# Patient Record
Sex: MALE | Race: WHITE | URBAN - METROPOLITAN AREA
[De-identification: names, ages, dates, MRNs, and addresses within clinical notes are randomized per-mention and may not be internally consistent; named-entity substitution may affect disease eponyms.]

---

## 2022-06-27 ENCOUNTER — INPATIENT (INPATIENT)
Facility: HOSPITAL | Age: 62
LOS: 0 days | Discharge: HOME | DRG: 380 | End: 2022-06-28
Attending: FAMILY MEDICINE | Admitting: FAMILY MEDICINE
Payer: MEDICAID

## 2022-06-27 VITALS
WEIGHT: 210.1 LBS | HEART RATE: 81 BPM | SYSTOLIC BLOOD PRESSURE: 155 MMHG | RESPIRATION RATE: 16 BRPM | DIASTOLIC BLOOD PRESSURE: 94 MMHG | OXYGEN SATURATION: 100 % | TEMPERATURE: 99 F | HEIGHT: 72 IN

## 2022-06-27 DIAGNOSIS — L03.90 CELLULITIS, UNSPECIFIED: ICD-10-CM

## 2022-06-27 DIAGNOSIS — Z98.890 OTHER SPECIFIED POSTPROCEDURAL STATES: Chronic | ICD-10-CM

## 2022-06-27 LAB
ADD ON TEST-SPECIMEN IN LAB: SIGNIFICANT CHANGE UP
ADD ON TEST-SPECIMEN IN LAB: SIGNIFICANT CHANGE UP
ALBUMIN SERPL ELPH-MCNC: 3.7 G/DL — SIGNIFICANT CHANGE UP (ref 3.3–5)
ALP SERPL-CCNC: 79 U/L — SIGNIFICANT CHANGE UP (ref 40–120)
ALT FLD-CCNC: 18 U/L — SIGNIFICANT CHANGE UP (ref 12–78)
ANION GAP SERPL CALC-SCNC: 5 MMOL/L — SIGNIFICANT CHANGE UP (ref 5–17)
APTT BLD: 31.7 SEC — SIGNIFICANT CHANGE UP (ref 27.5–35.5)
AST SERPL-CCNC: 14 U/L — LOW (ref 15–37)
BASOPHILS # BLD AUTO: 0.03 K/UL — SIGNIFICANT CHANGE UP (ref 0–0.2)
BASOPHILS NFR BLD AUTO: 0.6 % — SIGNIFICANT CHANGE UP (ref 0–2)
BILIRUB SERPL-MCNC: 0.4 MG/DL — SIGNIFICANT CHANGE UP (ref 0.2–1.2)
BUN SERPL-MCNC: 19 MG/DL — SIGNIFICANT CHANGE UP (ref 7–23)
CALCIUM SERPL-MCNC: 9.2 MG/DL — SIGNIFICANT CHANGE UP (ref 8.5–10.1)
CHLORIDE SERPL-SCNC: 108 MMOL/L — SIGNIFICANT CHANGE UP (ref 96–108)
CO2 SERPL-SCNC: 28 MMOL/L — SIGNIFICANT CHANGE UP (ref 22–31)
CREAT SERPL-MCNC: 0.87 MG/DL — SIGNIFICANT CHANGE UP (ref 0.5–1.3)
EGFR: 97 ML/MIN/1.73M2 — SIGNIFICANT CHANGE UP
EOSINOPHIL # BLD AUTO: 0.09 K/UL — SIGNIFICANT CHANGE UP (ref 0–0.5)
EOSINOPHIL NFR BLD AUTO: 1.8 % — SIGNIFICANT CHANGE UP (ref 0–6)
FLUAV AG NPH QL: SIGNIFICANT CHANGE UP
FLUBV AG NPH QL: SIGNIFICANT CHANGE UP
GLUCOSE SERPL-MCNC: 99 MG/DL — SIGNIFICANT CHANGE UP (ref 70–99)
HCT VFR BLD CALC: 41.4 % — SIGNIFICANT CHANGE UP (ref 39–50)
HGB BLD-MCNC: 13.8 G/DL — SIGNIFICANT CHANGE UP (ref 13–17)
IMM GRANULOCYTES NFR BLD AUTO: 0.2 % — SIGNIFICANT CHANGE UP (ref 0–1.5)
INR BLD: 1.09 RATIO — SIGNIFICANT CHANGE UP (ref 0.88–1.16)
LACTATE SERPL-SCNC: 1 MMOL/L — SIGNIFICANT CHANGE UP (ref 0.7–2)
LYMPHOCYTES # BLD AUTO: 1.02 K/UL — SIGNIFICANT CHANGE UP (ref 1–3.3)
LYMPHOCYTES # BLD AUTO: 20.4 % — SIGNIFICANT CHANGE UP (ref 13–44)
MCHC RBC-ENTMCNC: 29.1 PG — SIGNIFICANT CHANGE UP (ref 27–34)
MCHC RBC-ENTMCNC: 33.3 GM/DL — SIGNIFICANT CHANGE UP (ref 32–36)
MCV RBC AUTO: 87.3 FL — SIGNIFICANT CHANGE UP (ref 80–100)
MONOCYTES # BLD AUTO: 0.36 K/UL — SIGNIFICANT CHANGE UP (ref 0–0.9)
MONOCYTES NFR BLD AUTO: 7.2 % — SIGNIFICANT CHANGE UP (ref 2–14)
NEUTROPHILS # BLD AUTO: 3.49 K/UL — SIGNIFICANT CHANGE UP (ref 1.8–7.4)
NEUTROPHILS NFR BLD AUTO: 69.8 % — SIGNIFICANT CHANGE UP (ref 43–77)
PLATELET # BLD AUTO: 161 K/UL — SIGNIFICANT CHANGE UP (ref 150–400)
POTASSIUM SERPL-MCNC: 4.2 MMOL/L — SIGNIFICANT CHANGE UP (ref 3.5–5.3)
POTASSIUM SERPL-SCNC: 4.2 MMOL/L — SIGNIFICANT CHANGE UP (ref 3.5–5.3)
PROT SERPL-MCNC: 7.9 GM/DL — SIGNIFICANT CHANGE UP (ref 6–8.3)
PROTHROM AB SERPL-ACNC: 12.6 SEC — SIGNIFICANT CHANGE UP (ref 10.5–13.4)
RBC # BLD: 4.74 M/UL — SIGNIFICANT CHANGE UP (ref 4.2–5.8)
RBC # FLD: 14.5 % — SIGNIFICANT CHANGE UP (ref 10.3–14.5)
RSV RNA NPH QL NAA+NON-PROBE: SIGNIFICANT CHANGE UP
SARS-COV-2 RNA SPEC QL NAA+PROBE: SIGNIFICANT CHANGE UP
SODIUM SERPL-SCNC: 141 MMOL/L — SIGNIFICANT CHANGE UP (ref 135–145)
WBC # BLD: 5 K/UL — SIGNIFICANT CHANGE UP (ref 3.8–10.5)
WBC # FLD AUTO: 5 K/UL — SIGNIFICANT CHANGE UP (ref 3.8–10.5)

## 2022-06-27 PROCEDURE — 71045 X-RAY EXAM CHEST 1 VIEW: CPT | Mod: 26

## 2022-06-27 PROCEDURE — 93005 ELECTROCARDIOGRAM TRACING: CPT

## 2022-06-27 PROCEDURE — 73630 X-RAY EXAM OF FOOT: CPT | Mod: 26,RT

## 2022-06-27 PROCEDURE — 81001 URINALYSIS AUTO W/SCOPE: CPT

## 2022-06-27 PROCEDURE — 73590 X-RAY EXAM OF LOWER LEG: CPT | Mod: 26,RT

## 2022-06-27 PROCEDURE — 93971 EXTREMITY STUDY: CPT | Mod: RT

## 2022-06-27 PROCEDURE — 93010 ELECTROCARDIOGRAM REPORT: CPT

## 2022-06-27 PROCEDURE — 93926 LOWER EXTREMITY STUDY: CPT | Mod: 26,RT

## 2022-06-27 PROCEDURE — 93971 EXTREMITY STUDY: CPT | Mod: 26,RT

## 2022-06-27 PROCEDURE — 93926 LOWER EXTREMITY STUDY: CPT | Mod: RT

## 2022-06-27 PROCEDURE — 99223 1ST HOSP IP/OBS HIGH 75: CPT

## 2022-06-27 PROCEDURE — 99285 EMERGENCY DEPT VISIT HI MDM: CPT

## 2022-06-27 RX ORDER — PIPERACILLIN AND TAZOBACTAM 4; .5 G/20ML; G/20ML
3.38 INJECTION, POWDER, LYOPHILIZED, FOR SOLUTION INTRAVENOUS ONCE
Refills: 0 | Status: COMPLETED | OUTPATIENT
Start: 2022-06-27 | End: 2022-06-27

## 2022-06-27 RX ORDER — MILK THISTLE 150 MG
3 CAPSULE ORAL
Qty: 0 | Refills: 0 | DISCHARGE

## 2022-06-27 RX ORDER — PIPERACILLIN AND TAZOBACTAM 4; .5 G/20ML; G/20ML
3.38 INJECTION, POWDER, LYOPHILIZED, FOR SOLUTION INTRAVENOUS EVERY 8 HOURS
Refills: 0 | Status: DISCONTINUED | OUTPATIENT
Start: 2022-06-27 | End: 2022-06-28

## 2022-06-27 RX ORDER — ACETAMINOPHEN 500 MG
650 TABLET ORAL EVERY 6 HOURS
Refills: 0 | Status: DISCONTINUED | OUTPATIENT
Start: 2022-06-27 | End: 2022-06-28

## 2022-06-27 RX ORDER — KETOCONAZOLE 20 MG/G
1 AEROSOL, FOAM TOPICAL
Qty: 0 | Refills: 0 | DISCHARGE

## 2022-06-27 RX ORDER — LANOLIN ALCOHOL/MO/W.PET/CERES
3 CREAM (GRAM) TOPICAL AT BEDTIME
Refills: 0 | Status: DISCONTINUED | OUTPATIENT
Start: 2022-06-27 | End: 2022-06-28

## 2022-06-27 RX ORDER — ONDANSETRON 8 MG/1
4 TABLET, FILM COATED ORAL EVERY 8 HOURS
Refills: 0 | Status: DISCONTINUED | OUTPATIENT
Start: 2022-06-27 | End: 2022-06-28

## 2022-06-27 RX ORDER — VANCOMYCIN HCL 1 G
1500 VIAL (EA) INTRAVENOUS EVERY 12 HOURS
Refills: 0 | Status: DISCONTINUED | OUTPATIENT
Start: 2022-06-27 | End: 2022-06-27

## 2022-06-27 RX ORDER — SALICYLIC ACID 0.5 %
1 CLEANSER (GRAM) TOPICAL
Refills: 0 | Status: DISCONTINUED | OUTPATIENT
Start: 2022-06-27 | End: 2022-06-28

## 2022-06-27 RX ORDER — ZINC OXIDE 200 MG/G
1 OINTMENT TOPICAL
Refills: 0 | Status: DISCONTINUED | OUTPATIENT
Start: 2022-06-27 | End: 2022-06-28

## 2022-06-27 RX ORDER — ZINC OXIDE 200 MG/G
1 OINTMENT TOPICAL
Qty: 0 | Refills: 0 | DISCHARGE

## 2022-06-27 RX ORDER — SALICYLIC ACID 0.5 %
1 CLEANSER (GRAM) TOPICAL
Qty: 0 | Refills: 0 | DISCHARGE

## 2022-06-27 RX ADMIN — PIPERACILLIN AND TAZOBACTAM 200 GRAM(S): 4; .5 INJECTION, POWDER, LYOPHILIZED, FOR SOLUTION INTRAVENOUS at 18:56

## 2022-06-27 RX ADMIN — ZINC OXIDE 1 APPLICATION(S): 200 OINTMENT TOPICAL at 23:45

## 2022-06-27 NOTE — H&P ADULT - ASSESSMENT
61 y/o M presents with right foot wound and deformity     1. Right foot wound and deformity secondary to cellulitis vs. PAD vs. osteomyelitis   - Pt does not meet SIRS criteria   - Ordered MRI right foot and arterial doppler   - f/u x-ray official read, ESR, CRP, BCx x 2, UCx    - s/p Zosyn in the ER   - c/w Zosyn and Vancomycin    - Podiatry consult - Dr. Barr   - ID consult - Dr. Funes   - Vascular consult - Dr. Poole     2. History of Lymphoma (in remission past 10 years), right hip surgery   - Home medications reviewed with pt at the bedside     DVT ppx: SCDs (advance to pharmacological ppx if no planned intervention)

## 2022-06-27 NOTE — H&P ADULT - NSHPREVIEWOFSYSTEMS_GEN_ALL_CORE
Constitutional: negative for fatigue, negative for fever, negative for chills, negative for decreased appetite.  Skin: negative for rashes, negative for open wounds, negative for jaundice.   Eyes: negative for blurry vision, negative for double vision.   Ears, nose, throat: negative for ear pain, negative for nasal congestion, negative for sore throat, negative for lymph node swelling.   Cardiovascular: negative for chest pain, negative for palpitations, negative for lower extremity swelling.   Respiratory: negative for shortness of breath, negative for wheezing, negative for cough.   Gastrointestinal: negative for abdominal pain, negative for nausea, negative for vomiting, negative for diarrhea, negative for constipation, negative for blood in the stool, negative for black tarry stools.   Genitourinary: negative for burning on urination, negative for urinary urgency or frequency, negative for blood in the urine.   Endocrine: negative for cold intolerance, negative for heat intolerance, negative for increased thirst.   Hematologic: negative for easy bruising or bleeding.   Musculoskeletal: negative for muscle/joint pain, negative for decreased range of motion, right foot wound and deformity   Neurological: negative for dizziness, negative for headaches, negative for loss of consciousness, negative for motor weakness, negative for sensory deficits.   Psychiatric: negative for depression, negative for anxiety.

## 2022-06-27 NOTE — ED PROVIDER NOTE - CARDIAC, MLM
Electronically signed by Leena Govea on 9/7/2018 at 7:02 PM Normal rate, regular rhythm.  Heart sounds S1, S2.  No murmurs, rubs or gallops.

## 2022-06-27 NOTE — ED PROVIDER NOTE - CLINICAL SUMMARY MEDICAL DECISION MAKING FREE TEXT BOX
leg anatomy is distorted from prior lymphedema and lymphoma appears infected. Plan: XR, US, will require admission. leg anatomy is distorted from prior lymphedema and lymphoma appears infected. Plan: XR, US, will require admission.     see progress notes. ~Adolfo Adams PA-C leg anatomy is distorted from prior lymphedema and lymphoma - appears infected. Plan: XR, US, will require admission.     see progress notes. ~Adolfo Adams PA-C

## 2022-06-27 NOTE — ED PROVIDER NOTE - OBJECTIVE STATEMENT
63 y/o male with a PMHx of lymphedema and lymphoma presents to the ED c/o R foot swelling. Pt is at [facility] waiting for placement for an apartment s/p surgery to reduce deterioration of femur. Pt receiving treatment 3 times a week of tumeric and A&D ointment on wound. States swelling began since he began living at [facility]. pt was [place] at [facility] [place]5 [place] for lymphoma. denies fever, pain, and chills.

## 2022-06-27 NOTE — PHARMACOTHERAPY INTERVENTION NOTE - COMMENTS
Medication reconciliation completed.  Reviewed Medication list and confirmed med allergies with list from Care Facility; confirmed with Dr. First Medreno.

## 2022-06-27 NOTE — PATIENT PROFILE ADULT - FALL HARM RISK - HARM RISK INTERVENTIONS

## 2022-06-27 NOTE — ED PROVIDER NOTE - PROGRESS NOTE DETAILS
Spoke with podiatry, wants medicine to admit, will consult. ~Adolfo Adams PA-C Spoke with hospitalist dr. Whitlock, will admit patient. ~Adolfo Adams PA-C PA: Patient is a 62 year old male with PMHx of RLE lymphedema and lymphoma who presents to [facility] c/o right foot swelling. Pt is at [facility] waiting for placement for an apartment s/p surgery to reduce deterioration of femur. Pt is receiving treatment 3 times a week of tumeric and A&D ointment on wound. ~Adolfo Adams PA-C

## 2022-06-27 NOTE — H&P ADULT - HISTORY OF PRESENT ILLNESS
63 y/o M with PMH lymphoma (in remission past 10 years), right hip surgery presents with right foot wounds and deformity. Pt reports having right hip surgery years ago and he feels that lymphoma may have seeded. He is currently in remission for lymphoma and has [place] had any recent [place] [facility] He states that the lymphoma spread in his right femur and his right left is now 5.5 inches shorter than the left. He was fitted for a boot at [facility] 8 years ago and he state that the boot was too tight and caused chronic foots on his right foot, swelling of the foot. He came to the ER today because a vascular doctor saw him at the facility and recommended he come to be evaluated. He reports chronic wounds on the foot, ulcer near the 5th digit with oozing, and bleeding for the first time today. The wound is not more hot, tender, or painful than it has been. His wounds have been managed a the facility with zinc oxide and A&D ointment. No other current complaints. Denies fevers, chills, chest pain, SOB, abdominal pain, N/V, diarrhea/constipation.     ER course: VSS. Labs: unremarkable. COVID, influenza A/B, RSV negative. EKG:     Imaging:   - CXR: no consolidation, no effusion, no pneumothorax (personally reviewed).   - XR right tib/fib: no facture or dislocation, osteoarthritis (personally reviewed).   - XR right foot: distorted phalanges, bony erosion 5th digit, soft tissue swelling (personally reviewed).   - Venous doppler lower extremities: No evidence of right lower extremity deep venous thrombosis. Limited visualization of the right calf veins.    Pt was given Zosyn. He is being admitted to med/surg for further management.  63 y/o M with PMH lymphoma (in remission past 10 years), right hip surgery presents with right foot wounds and deformity. Pt reports having right hip surgery years ago and he feels that lymphoma may have seeded. He is currently in remission for lymphoma and has not had any recent f/u at [facility]. He states that the lymphoma spread in his right femur and his right left is now 5.5 inches shorter than the left. He was fitted for a boot at [facility] 8 years ago and he state that the boot was too tight and caused chronic foots on his right foot, swelling of the foot. He came to the ER today because a vascular doctor saw him at the facility and recommended he come to be evaluated. He reports chronic wounds on the foot, ulcer near the 5th digit with oozing, and bleeding for the first time today. The wound is not more hot, tender, or painful than it has been. His wounds have been managed a the facility with zinc oxide and A&D ointment. No other current complaints. Denies fevers, chills, chest pain, SOB, abdominal pain, N/V, diarrhea/constipation.     ER course: VSS. Labs: unremarkable. COVID, influenza A/B, RSV negative. EKG: NSR with HR 81 bpm, PVCs, RBBB,  ms prolonged, no ST segment changes, no T wave inversions (personally reviewed).     Imaging:   - CXR: no consolidation, no effusion, no pneumothorax (personally reviewed).   - XR right tib/fib: no facture or dislocation, osteoarthritis (personally reviewed).   - XR right foot: distorted phalanges, bony erosion 5th digit, soft tissue swelling (personally reviewed).   - Venous doppler lower extremities: No evidence of right lower extremity deep venous thrombosis. Limited visualization of the right calf veins.    Pt was given Zosyn. He is being admitted to med/surg for further management.

## 2022-06-27 NOTE — ED PROVIDER NOTE - MUSCULOSKELETAL, MLM
Spine appears normal, range of motion is not limited, no muscle or joint tenderness. RLE: +Extensive lymphedema right lower extremity engulfing all 5 toes. 3+ pitting edema. +Superficial ulceration of dorsum of right foot with malodorous drainage.

## 2022-06-27 NOTE — ED ADULT NURSE NOTE - OBJECTIVE STATEMENT
Pt A&Ox4, presents to the ED from [facility] c/o RLE swelling and foot wound. PMH of right femur lymphoma. RLE shorter than left due to cancer. Swelling completely covering toes. Pt currently receiving antibiotics and wound care and nursing home. Denies any pain or fevers.

## 2022-06-27 NOTE — ED PROVIDER NOTE - WR ORDER STATUS 3
You might receive a short 3 question survey about today's visit. Your care provider team appreciates your feedback.  Take antibiotics as prescribed until gone  Continue your other medications  Follow-up with primary care doctor if not improving with treatment  
Performed

## 2022-06-27 NOTE — ED PROVIDER NOTE - NS ED ROS FT
Constitutional: nad, well appearing  HEENT:  no nasal congestion, eye drainage or ear pain.    CVS:  no cp  Resp:  No sob, no cough  GI:  no abdominal pain, no nausea or vomiting  :  no dysuria  MSK: no joint pain or limited ROM, +swelling to R foot  Skin: no rash  Neuro: no change in mental status or level of consciousness  Heme/lymph: no bleeding

## 2022-06-27 NOTE — H&P ADULT - NSHPPHYSICALEXAM_GEN_ALL_CORE
ICU Vital Signs Last 24 Hrs  T(C): 37.1 (27 Jun 2022 21:12), Max: 37.2 (27 Jun 2022 17:36)  T(F): 98.7 (27 Jun 2022 21:12), Max: 99 (27 Jun 2022 17:36)  HR: 78 (27 Jun 2022 21:12) (78 - 81)  BP: 142/85 (27 Jun 2022 21:12) (142/85 - 155/94)  RR: 16 (27 Jun 2022 21:12) (16 - 16)  SpO2: 99% (27 Jun 2022 21:12) (99% - 100%)    General: Awake and alert, cooperative with exam. No acute distress.   Skin: Warm, dry, and pink.   Eyes: Pupils equal and reactive to light. Extraocular eye movements intact. No conjunctival injection, discharge, or scleral icterus.   HEENT: Atraumatic, normocephalic. Moist mucus membranes.   Cardiology: Normal S1, S2. No murmurs, rubs, or gallops. Regular rate and rhythm.   Respiratory: Lungs clear to ascultation bilaterally. Good air exchange. No wheezes, rales, or rhonchi. Normal chest expansion.   Gastrointestinal: Positive bowel sounds. Soft, non-tender, non-distended. No guarding, rigidity, or rebound tenderness. No hepatosplenomegaly.   Musculoskeletal: 5/5 motor strength in all extremities. Normal range of motion.   Extremities: Right lower extremity with venous stasis changes, erythema, pitting edema, wounds on the foot and deformed foot. Left lower extremity WNL. Dorsalis pedis pulse 2+ b/l.   Neurological: A+Ox3 (person, place, and time). Cranial nerves 2-12 intact. Normal speech. No facial droop. No focal neurological deficits.   Psychiatric: Normal affect. Normal mood.

## 2022-06-27 NOTE — ED ADULT TRIAGE NOTE - CHIEF COMPLAINT QUOTE
Pt presents to ER from [facility], operated by [facility] c/o RLE redness and drainage. Pt reports recent sx on RLE and wants to r/o infection. Denies fever/chills

## 2022-06-27 NOTE — H&P ADULT - NSICDXFAMILYHX_GEN_ALL_CORE_FT
FAMILY HISTORY:  Father  Still living? Unknown  FH: lung cancer, Age at diagnosis: Age Unknown    Mother  Still living? Unknown  FH: Alzheimers disease, Age at diagnosis: Age Unknown    Grandparent  Still living? Unknown  FH: lung cancer, Age at diagnosis: Age Unknown

## 2022-06-27 NOTE — H&P ADULT - NSHPSOCIALHISTORY_GEN_ALL_CORE
Lives at [facility]. Uses a walker to ambulate. Independent with ADLs and IADLs. Denies smoking, alcohol, drug use.

## 2022-06-27 NOTE — ED ADULT NURSE NOTE - CHIEF COMPLAINT QUOTE
Pt presents to ER from [facility] c/o RLE redness and drainage. Pt reports recent sx on RLE and wants to r/o infection. Denies fever/chills

## 2022-06-27 NOTE — PATIENT PROFILE ADULT - STATED REASON FOR ADMISSION
I was tested this morning at [facility] and they wanted vascular to look at my leg

## 2022-06-27 NOTE — ED PROVIDER NOTE - PHYSICAL EXAMINATION
Constitutional: NAD, well appearing  HEENT: no rhinorrhea, PERRL, no oropharyngeal erythema or exudates, midline uvula.  TMs clear.  CVS:  RRR, no m/r/g  Resp:  CTAB  GI: soft, ntnd  MSK:  no restriction to rom, full ROM to all extremities, erythremic ulceration and significant edema to R foot unable to significantly appreciate toes  Neuro:  A&Ox3, 5/5 strength to all extremities,  SILT to all extremities  Skin: no rash  psych: clear thought content  Heme/lymph:  No LAD attending: Constitutional: NAD, well appearing  HEENT: no rhinorrhea, PERRL, no oropharyngeal erythema or exudates, midline uvula.  TMs clear.  CVS:  RRR, no m/r/g  Resp:  CTAB  GI: soft, ntnd  MSK:  no restriction to rom, full ROM to all extremities, erythremic ulceration and significant edema to R foot unable to significantly appreciate toes  Neuro:  A&Ox3, 5/5 strength to all extremities,  SILT to all extremities  Skin: no rash  psych: clear thought content  Heme/lymph:  No LAD

## 2022-06-28 ENCOUNTER — TRANSCRIPTION ENCOUNTER (OUTPATIENT)
Age: 62
End: 2022-06-28

## 2022-06-28 VITALS
SYSTOLIC BLOOD PRESSURE: 141 MMHG | HEART RATE: 59 BPM | DIASTOLIC BLOOD PRESSURE: 82 MMHG | RESPIRATION RATE: 19 BRPM | OXYGEN SATURATION: 100 % | TEMPERATURE: 98 F

## 2022-06-28 LAB
APPEARANCE UR: CLEAR — SIGNIFICANT CHANGE UP
BILIRUB UR-MCNC: NEGATIVE — SIGNIFICANT CHANGE UP
COLOR SPEC: YELLOW — SIGNIFICANT CHANGE UP
DIFF PNL FLD: NEGATIVE — SIGNIFICANT CHANGE UP
GLUCOSE UR QL: NEGATIVE — SIGNIFICANT CHANGE UP
KETONES UR-MCNC: NEGATIVE — SIGNIFICANT CHANGE UP
LEUKOCYTE ESTERASE UR-ACNC: ABNORMAL
NITRITE UR-MCNC: NEGATIVE — SIGNIFICANT CHANGE UP
PH UR: 6 — SIGNIFICANT CHANGE UP (ref 5–8)
PROT UR-MCNC: NEGATIVE — SIGNIFICANT CHANGE UP
SP GR SPEC: 1.02 — SIGNIFICANT CHANGE UP (ref 1.01–1.02)
UROBILINOGEN FLD QL: NEGATIVE — SIGNIFICANT CHANGE UP

## 2022-06-28 PROCEDURE — 99239 HOSP IP/OBS DSCHRG MGMT >30: CPT

## 2022-06-28 RX ORDER — COLLAGENASE CLOSTRIDIUM HIST. 250 UNIT/G
1 OINTMENT (GRAM) TOPICAL DAILY
Refills: 0 | Status: DISCONTINUED | OUTPATIENT
Start: 2022-06-28 | End: 2022-06-28

## 2022-06-28 RX ORDER — ACETAMINOPHEN 500 MG
2 TABLET ORAL
Qty: 0 | Refills: 0 | DISCHARGE

## 2022-06-28 RX ORDER — ENOXAPARIN SODIUM 100 MG/ML
40 INJECTION SUBCUTANEOUS EVERY 24 HOURS
Refills: 0 | Status: DISCONTINUED | OUTPATIENT
Start: 2022-06-28 | End: 2022-06-28

## 2022-06-28 RX ORDER — MUPIROCIN 20 MG/G
1 OINTMENT TOPICAL
Refills: 0 | Status: DISCONTINUED | OUTPATIENT
Start: 2022-06-28 | End: 2022-06-28

## 2022-06-28 RX ORDER — COLLAGENASE CLOSTRIDIUM HIST. 250 UNIT/G
1 OINTMENT (GRAM) TOPICAL
Qty: 0 | Refills: 0 | DISCHARGE
Start: 2022-06-28

## 2022-06-28 RX ORDER — MUPIROCIN 20 MG/G
1 OINTMENT TOPICAL
Qty: 0 | Refills: 0 | DISCHARGE
Start: 2022-06-28

## 2022-06-28 RX ORDER — POVIDONE-IODINE 5 %
0 AEROSOL (ML) TOPICAL
Qty: 0 | Refills: 0 | DISCHARGE

## 2022-06-28 RX ADMIN — Medication 110 MILLIGRAM(S): at 00:06

## 2022-06-28 RX ADMIN — PIPERACILLIN AND TAZOBACTAM 25 GRAM(S): 4; .5 INJECTION, POWDER, LYOPHILIZED, FOR SOLUTION INTRAVENOUS at 00:06

## 2022-06-28 NOTE — CONSULT NOTE ADULT - ASSESSMENT
Assessment:   - RLE Chronic Venous Insuffiencey   - Right Foot Non healing ulceration  - Difficulty with ambulation      Plan:  - Pt evaluated and chart reviewed.  - Reviewed the imaging and labs  - Discussed the potential causes and plan of management with the Pt.  - Discussed all the different aspects of treatment with the Pt including conservative and surgical options.  - Pt demonstrated verbal understanding.  - Applied zinc oxide to dorsal foot wound and dry sterile dressing to right foot and applied light compression with ace bandgae   - Recommended ID consult.  - Rec. Vasc consult  - X-rays reviewed no evidence of soft tissue emphysema on wet read, will f/u with final impression  - All additional care per medicine, appreciated.  - Podiatry will follow the case in house.
61 y/o M with PMH lymphoma (in remission past 10 years), right hip surgery presents with right foot wounds and deformity. Pt reports having right hip surgery years ago and he feels that lymphoma may have seeded. He is currently in remission for lymphoma and has [place] [facility] He states that the lymphoma spread in his right femur and his right left is now 5.5 inches shorter than the left. He was fitted for a boot at [facility] 8 years ago and he state that the boot was too tight and caused chronic foots on his right foot, swelling of the foot. He came to the ER today because a vascular doctor saw him at the facility and recommended he come to be evaluated. He reports chronic wounds on the foot, ulcer near the 5th digit with oozing, and bleeding for the first time. The wound is not more hot, tender, or painful than it has been. His wounds have been managed a the facility with zinc oxide and A&D ointment. No other current complaints.  Venous doppler lower extremities: No evidence of right lower extremity deep venous thrombosis. Limited visualization of the right calf veins. Pt was given Zosyn. He is being admitted to med/surg for further management.      1. R foot ulcer. RLE venous stasis  - R foot not infected  - podiatry/vascular eval noted  - observe off antibiotics   - fu cbc  - supportive care  - monitor temps    2. other issues - care per medicine

## 2022-06-28 NOTE — DISCHARGE NOTE NURSING/CASE MANAGEMENT/SOCIAL WORK - NSDCPEFALRISK_GEN_ALL_CORE
For information on Fall & Injury Prevention, visit: [email or web address] OR  [email or web address] OR  https://www.cdc.gov/steadi/patient.html

## 2022-06-28 NOTE — PROGRESS NOTE ADULT - ASSESSMENT
62M with right foot deformity and wound, likely pressure wound from ill fitting boot. Arterial duplex does not show any significant stenosis.     recommend continue local wound care to right foot  pain control prn  medical management as per primary team  no vascular surgery intervention necessary at this time, please reconsult if necessary    Plan discussed with Dr. Mesa

## 2022-06-28 NOTE — PROGRESS NOTE ADULT - ASSESSMENT
61 y/o M presents with right foot wound and deformity     1. Right foot nonhealing ulcer and deformity secondary to cellulitis vs. PAD vs. osteomyelitis   - Pt does not meet SIRS criteria   - c/w Zosyn and Vancomycin    - Ordered MRI right foot and   Arterial doppler ->Varying degrees of atherosclerosis in the visualized right lower   extremity arteries as described.   RLE venous doppler -no DVT  f/u x-ray  ESR, CRP, BCx x 2, UCx    - s/p Zosyn in the ER   - Podiatry consult - Dr. Barr   - ID consult - Dr. Funes   - Vascular consult - Dr. Poole     2. History of Lymphoma (in remission past 10 years), right hip surgery   - Home medications reviewed with pt at the bedside     DVT ppx: SCDs (advance to pharmacological ppx if no planned intervention)

## 2022-06-28 NOTE — DISCHARGE NOTE PROVIDER - NSDCMRMEDTOKEN_GEN_ALL_CORE_FT
A+D topical cream: Apply topically to affected area 2 times a day, As Needed  collagenase 250 units/g topical ointment: 1 application topically once a day  Fleet Enema 19 g-7 g rectal enema: 133 milliliter(s) rectal once, As Needed  ketoconazole 2% topical cream: Apply topically to affected area on face once a day until resolved  mupirocin 2% topical ointment: 1 application topically 2 times a day  Turmeric 500 mg oral capsule: 3 cap(s) orally once a day  zinc oxide topical cream: Apply topically to affected area 6 times a day, As Needed

## 2022-06-28 NOTE — DISCHARGE NOTE PROVIDER - NSDCCPCAREPLAN_GEN_ALL_CORE_FT
PRINCIPAL DISCHARGE DIAGNOSIS  Diagnosis: Healing pressure ulcer, unspecified pressure ulcer stage  Assessment and Plan of Treatment: follow up with your podiatrist as outpatient  daily dressing change instructions for R foot as per podiatry  Please remove dressings to Right foot carefully.   - Cleanse the wound to the right foot with normal saline moistened gauze, and dry it with sterile gauze.   - Then apply santyl/collegenase, to the right plantar wound, and bactroban/muporicin to the right dorsal wound.   - Then cover the ulcerative lesions with a 4x4 gauze, and wrap with Kerlix and secure with tape.   - Keep the compressive dressing to the left leg clean, dry and intact at all times.   - Please, have the left heel offloaded in the provided Z-flex boot at all times, while the patient is bedbound.      SECONDARY DISCHARGE DIAGNOSES  Diagnosis: Lymphedema, limb  Assessment and Plan of Treatment:

## 2022-06-28 NOTE — DISCHARGE NOTE NURSING/CASE MANAGEMENT/SOCIAL WORK - FLU SEASON?
NURSE NOTES:

Received report from JOSIAH Naidu. Pt is awake, lying semi-roberson's; comfortably resting. No 
signs of acute distress noted. Pt denies any pain at this time. AOx4; able to make needs 
known. No IV site noted. No erythema, bleeding, or infiltration noted. Bed at lowest 
position. Brakes on. Siderails up x3. Bed alarm on. Call light within reach. Will continue 
to monitor. Yes...

## 2022-06-28 NOTE — CONSULT NOTE ADULT - SUBJECTIVE AND OBJECTIVE BOX
Date of service: 6/28/22  HPI:61 y/o M with PMH lymphoma (in remission past 10 years),and  right hip surgery presents with right foot wounds and deformity. Pt reports having right hip surgery years ago and he feels that lymphoma may have seeded. He is currently in remission for lymphoma and has not had any recent f/u at [facility] [place]. He states that the lymphoma spread in his right femur and his right left is now 5.5 inches shorter than the left. He was fitted for a boot at [facility] 8 years ago and he states the boot was too tight and caused chronic wounds on his right foot, along with swelling which lead to the deformity of the foot. He came to the ER today because a vascular doctor saw him at the facility and recommended he come to be evaluated. He reports chronic wounds on the foot, ulcer near the 5th digit with oozing, and states that wound has been present for approximately one month. The wound is no more hot, tender, or painful than it has been. His wounds have been managed a the facility with zinc oxide and A&D ointment. Denies fevers, chills, chest pain, SOB, abdominal pain, N/V, diarrhea/constipation.     REVIEW OF SYSTEMS:  CONSTITUTIONAL: No fever, chills,  weight loss, or fatigue  EYES: No eye pain, visual disturbances, or discharge  ENMT:  No difficulty hearing, tinnitus, vertigo; No sinus or throat pain  NECK: No pain or stiffness  RESPIRATORY: No cough, wheezing, or hemoptysis; No shortness of breath  CARDIOVASCULAR: No chest pain, palpitations, dizziness, or leg swelling  GASTROINTESTINAL: No abdominal or epigastric pain. No nausea, vomiting, or hematemesis; No diarrhea or constipation. No melena or hematochezia.  GENITOURINARY: No dysuria, frequency, hematuria, or incontinence  NEUROLOGICAL: No headaches, memory loss, loss of strength, numbness, or tremors  SKIN: RLE chronic wounds  LYMPH NODES: No enlarged glands  ENDOCRINE: No heat or cold intolerance; No hair loss  MUSCULOSKELETAL: Non functional Right leg  HEME/LYMPH: No easy bruising, or bleeding gums    PAST MEDICAL & SURGICAL HISTORY:  History of hip surgery  right hip    Allergies  No Known Allergies      MEDICATIONS  (STANDING):  doxycycline IVPB 100 milliGRAM(s) IV Intermittent every 12 hours  piperacillin/tazobactam IVPB.. 3.375 Gram(s) IV Intermittent every 8 hours  vitamin A &amp; D Ointment 1 Application(s) Topical two times a day  zinc oxide 20% Ointment 1 Application(s) Topical two times a day    MEDICATIONS  (PRN):  acetaminophen     Tablet .. 650 milliGRAM(s) Oral every 6 hours PRN Temp greater or equal to 38C (100.4F), Mild Pain (1 - 3)  aluminum hydroxide/magnesium hydroxide/simethicone Suspension 30 milliLiter(s) Oral every 4 hours PRN Dyspepsia  melatonin 3 milliGRAM(s) Oral at bedtime PRN Insomnia  ondansetron Injectable 4 milliGRAM(s) IV Push every 8 hours PRN Nausea and/or Vomiting    SOCIAL HISTORY:    FAMILY HISTORY:  FH: Alzheimers disease (Mother)    FH: lung cancer (Father, Grandparent)    VITALS:  Vital Signs Last 24 Hrs  T(C): 36.6 (06-27-22 @ 22:04), Max: 37.2 (06-27-22 @ 17:36)  T(F): 97.9 (06-27-22 @ 22:04), Max: 99 (06-27-22 @ 17:36)  HR: 82 (06-27-22 @ 22:04) (78 - 82)  BP: 113/71 (06-27-22 @ 22:04) (113/71 - 155/94)  BP(mean): 82 (06-27-22 @ 22:04) (82 - 82)  RR: 18 (06-27-22 @ 22:04) (16 - 18)  SpO2: 98% (06-27-22 @ 22:04) (98% - 100%)    Physical Examination:  Constitutional: AAOx3, non-focal; NAD, comfortable resting   Focused LE exam:  Vascular: Temperature gradient is warm to warm b/l, Pulses on right non-palpable due to edema,  DP/PT is 2/4 on left, capillary re-fill time is brisk and instantaneous to digits 1-5 left. Non pitting edema noted to right lower extremity   Neuro: diminished protective sensation to the foot and digits 1-5 right and intact to digits 1-5 on left.  Derm:  Skin is warm, Gross deformity note to right foot due to overgrowth of epidermal layer of skin, unable to visualize digits 1-4 and first digit also difficult  approx 3.5x3.5 cm dorsal partial thickness ulceration on the right foot, no pus, no drainge no probe to bone. Hypergranular full thickness ulceration noted to the distal aspect of right foot approx 6.8 x 5.2 cm. No probe to bone, no pus no periwound erythema to right foot.  Musculoskeletal: Manual muscle testing is 5/5 in all muscle groups of the foot/ankle to left foot and ankle and 0/5 in all muscle groups of RLE        LABS:                          13.8   5.00  )-----------( 161      ( 27 Jun 2022 17:44 )             41.4       06-27    141  |  108  |  19  ----------------------------<  99  4.2   |  28  |  0.87    Ca    9.2      27 Jun 2022 17:44    TPro  7.9  /  Alb  3.7  /  TBili  0.4  /  DBili  x   /  AST  14<L>  /  ALT  18  /  AlkPhos  79  06-27      PT/INR - ( 27 Jun 2022 17:44 )   PT: 12.6 sec;   INR: 1.09 ratio         PTT - ( 27 Jun 2022 17:44 )  PTT:31.7 sec            RADIOLOGY:          
Patient is a 62y old  Male who presents with a chief complaint of right foot wound and deformity (2022 13:25)    HPI:  63 y/o M with PMH lymphoma (in remission past 10 years), right hip surgery presents with right foot wounds and deformity. Pt reports having right hip surgery years ago and he feels that lymphoma may have seeded. He is currently in remission for lymphoma and has not had any recent f/u at [facility] [place]. He states that the lymphoma spread in his right femur and his right left is now 5.5 inches shorter than the left. He was fitted for a boot at [facility] 8 years ago and he state that the boot was too tight and caused chronic foots on his right foot, swelling of the foot. He came to the ER today because a vascular doctor saw him at the facility and recommended he come to be evaluated. He reports chronic wounds on the foot, ulcer near the 5th digit with oozing, and bleeding for the first time. The wound is not more hot, tender, or painful than it has been. His wounds have been managed a the facility with zinc oxide and A&D ointment. No other current complaints.  Venous doppler lower extremities: No evidence of right lower extremity deep venous thrombosis. Limited visualization of the right calf veins. Pt was given Zosyn. He is being admitted to med/surg for further management.        PMH: as above  PSH: as above  Meds: per reconciliation sheet, noted below  MEDICATIONS  (STANDING):  collagenase Ointment 1 Application(s) Topical daily  doxycycline IVPB 100 milliGRAM(s) IV Intermittent every 12 hours  enoxaparin Injectable 40 milliGRAM(s) SubCutaneous every 24 hours  mupirocin 2% Ointment 1 Application(s) Topical two times a day  piperacillin/tazobactam IVPB.. 3.375 Gram(s) IV Intermittent every 8 hours  vitamin A &amp; D Ointment 1 Application(s) Topical two times a day  zinc oxide 20% Ointment 1 Application(s) Topical two times a day      Allergies    No Known Allergies    Intolerances      Social: no smoking, no alcohol, no illegal drugs; no recent travel, no exposure to TB  FAMILY HISTORY:  FH: Alzheimers disease (Mother)    FH: lung cancer (Father, Grandparent)       no history of premature cardiovascular disease in first degree relatives    ROS: the patient denies fever, no chills, no HA, no dizziness, no sore throat, no blurry vision, no CP, no palpitations, no SOB, no cough, no abdominal pain, no diarrhea, no N/V, no dysuria, no leg pain, no claudication, no rash, no joint aches, no rectal pain or bleeding, no night sweats    All other systems reviewed and are negative    Vital Signs Last 24 Hrs  T(C): 36.7 (2022 08:55), Max: 37.2 (2022 17:36)  T(F): 98 (2022 08:55), Max: 99 (2022 17:36)  HR: 65 (2022 08:55) (65 - 82)  BP: 143/86 (2022 08:55) (113/71 - 155/94)  BP(mean): 82 (2022 22:04) (82 - 82)  RR: 18 (2022 08:55) (16 - 18)  SpO2: 98% (2022 08:55) (98% - 100%)  Daily Height in cm: 182.88 (2022 17:36)    Daily     PE:  Constitutional: NAD   HEENT: NC/AT, EOMI, PERRLA, conjunctivae clear; ears and nose atraumatic; pharynx benign  Neck: supple; thyroid not palpable  Back: no tenderness  Respiratory: respiratory effort normal; clear to auscultation  Cardiovascular: S1S2 regular, no murmurs  Abdomen: soft, not tender, not distended, positive BS; liver and spleen WNL  Genitourinary: no suprapubic tenderness  Lymphatic: no LN palpable  Musculoskeletal: no muscle tenderness, no joint swelling or tenderness  Extremities: no pedal edema  Neurological/ Psychiatric: AxOx3, Judgement and insight normal;  moving all extremities  Skin: no rashes; no palpable lesions R foot nonhealing ulcer      Labs: all available labs reviewed                        13.8   5.00  )-----------( 161      ( 2022 17:44 )             41.4     06-    141  |  108  |  19  ----------------------------<  99  4.2   |  28  |  0.87    Ca    9.2      2022 17:44    TPro  7.9  /  Alb  3.7  /  TBili  0.4  /  DBili  x   /  AST  14<L>  /  ALT  18  /  AlkPhos  79       LIVER FUNCTIONS - ( 2022 17:44 )  Alb: 3.7 g/dL / Pro: 7.9 gm/dL / ALK PHOS: 79 U/L / ALT: 18 U/L / AST: 14 U/L / GGT: x           Urinalysis Basic - ( 2022 06:04 )    Color: Yellow / Appearance: Clear / S.020 / pH: x  Gluc: x / Ketone: Negative  / Bili: Negative / Urobili: Negative   Blood: x / Protein: Negative / Nitrite: Negative   Leuk Esterase: Trace / RBC: Negative /HPF / WBC 3-5   Sq Epi: x / Non Sq Epi: Few / Bacteria: x          Radiology: all available radiological tests reviewed  < from: US Duplex Venous Lower Ext Ltd, Right (22 @ 19:45) >    ACC: [id number] EXAM:  US DPLX LWR EXT VEINS LTD RT                          PROCEDURE DATE:  2022          INTERPRETATION:  CLINICAL INFORMATION: Right leg swelling    COMPARISON: None available.    TECHNIQUE: Duplex sonography of the RIGHT LOWER extremity veins with   color and spectral Doppler, with and without compression.    FINDINGS:    There is normal compressibility of the right common femoral, femoral and   popliteal veins.  The contralateral common femoral vein is patent.  Dopplerexamination shows normal spontaneous and phasic flow.    No posterior tibial vein thrombosis is detected. The remaining calf veins   are not visualized.    IMPRESSION:  No evidence of right lower extremity deep venous thrombosis.  Limited visualization of the right calf veins.        < end of copied text >    Advanced directives addressed: full resuscitation

## 2022-06-28 NOTE — PROGRESS NOTE ADULT - SUBJECTIVE AND OBJECTIVE BOX
61 y/o M with PMH lymphoma (in remission past 10 years), and right hip surgery presents with right foot wounds and deformity. Pt reports having right hip surgery years ago and he feels that lymphoma may have seeded. He is currently in remission for lymphoma and [place] [facility] [place] He states that the lymphoma spread in his right femur and his right left is now 5.5 inches shorter than the left. He was fitted for a boot at [facility] 8 years ago and he states the boot was too tight and caused chronic wounds on his right foot, along with swelling which lead to the deformity of the foot. He came to the ER today because a vascular doctor saw him at the facility and recommended he come to be evaluated. He reports chronic wounds on the foot, ulcer near the 5th digit with oozing, and states that wound has been present for approximately one month but has been decreasing in size. The wound is no more hot, tender, or painful than it has been. His wounds have been managed a the facility with zinc oxide and A&D ointment. Denies fevers, chills, chest pain, SOB, abdominal pain, N/V, diarrhea/constipation.  Vascular surgery was consulted to evaluate for the foot wound.    Vital Signs Last 24 Hrs  T(C): 36.7 (28 Jun 2022 08:55), Max: 37.2 (27 Jun 2022 17:36)  T(F): 98 (28 Jun 2022 08:55), Max: 99 (27 Jun 2022 17:36)  HR: 65 (28 Jun 2022 08:55) (65 - 82)  BP: 143/86 (28 Jun 2022 08:55) (113/71 - 155/94)  BP(mean): 82 (27 Jun 2022 22:04) (82 - 82)  RR: 18 (28 Jun 2022 08:55) (16 - 18)  SpO2: 98% (28 Jun 2022 08:55) (98% - 100%)    Labs:                              13.8   5.00  )-----------( 161      ( 27 Jun 2022 17:44 )             41.4     CBC Full  -  ( 27 Jun 2022 17:44 )  WBC Count : 5.00 K/uL  RBC Count : 4.74 M/uL  Hemoglobin : 13.8 g/dL  Hematocrit : 41.4 %  Platelet Count - Automated : 161 K/uL  Mean Cell Volume : 87.3 fl  Mean Cell Hemoglobin : 29.1 pg  Mean Cell Hemoglobin Concentration : 33.3 gm/dL  Auto Neutrophil # : 3.49 K/uL  Auto Lymphocyte # : 1.02 K/uL  Auto Monocyte # : 0.36 K/uL  Auto Eosinophil # : 0.09 K/uL  Auto Basophil # : 0.03 K/uL  Auto Neutrophil % : 69.8 %  Auto Lymphocyte % : 20.4 %  Auto Monocyte % : 7.2 %  Auto Eosinophil % : 1.8 %  Auto Basophil % : 0.6 %    06-27    141  |  108  |  19  ----------------------------<  99  4.2   |  28  |  0.87    Ca    9.2      27 Jun 2022 17:44    TPro  7.9  /  Alb  3.7  /  TBili  0.4  /  DBili  x   /  AST  14<L>  /  ALT  18  /  AlkPhos  79  06-27    LIVER FUNCTIONS - ( 27 Jun 2022 17:44 )  Alb: 3.7 g/dL / Pro: 7.9 gm/dL / ALK PHOS: 79 U/L / ALT: 18 U/L / AST: 14 U/L / GGT: x           PT/INR - ( 27 Jun 2022 17:44 )   PT: 12.6 sec;   INR: 1.09 ratio         PTT - ( 27 Jun 2022 17:44 )  PTT:31.7 sec    Physical Examination:  Constitutional: AAOx3, non-focal; NAD, comfortable resting   Focused LE exam:  Vascular: Temperature gradient is warm to warm b/l, Pulses on right mildly palpable,  DP/PT is 2/4 on left, capillary re-fill time is brisk and instantaneous to digits 1-5 left. Non pitting edema noted to right lower extremity. Palpable femoral and popliteal pulses.  Neuro: diminished protective sensation to the foot and digits 1-5 right and intact to digits 1-5 on left.  Derm:  Skin is warm, Gross deformity note to right foot due to overgrowth of epidermal layer of skin, unable to visualize digits 1-4 and first digit also difficult  approx 3.5x3.5 cm dorsal partial thickness ulceration on the right foot, no pus, no drainage no probe to bone. Hypergranular full thickness ulceration noted to the distal aspect of right foot approx 6.8 x 5.2 cm. No probe to bone, no pus no periwound erythema to right foot.  Musculoskeletal: Manual muscle testing is 5/5 in all muscle groups of the foot/ankle to left foot and ankle and 0/5 in all muscle groups of RLE    ACC: [id number] EXAM:  US DPLX LWR EXT ARTS LTD RT                          PROCEDURE DATE:  06/27/2022          INTERPRETATION:  CLINICAL INDICATION: Right lower extremity edema and   pain, assess PAD.    TECHNIQUE: Right lower extremity duplex arterial ultrasound was performed   utilizing grayscale, color and spectral Doppler.    COMPARISON: None.    FINDINGS:    Right common femoral artery: PSV 87.1 cm/s and EDV 0 cm/s. Triphasic  Right deep femoral artery: PSV 64.8 cm/s and EDV 0 cm/s.  Rightproximal femoral artery: PSV 99.7 cm/s and EDV 18.4 cm/s. Triphasic  Right mid femoral artery: PSV 90.4 cm/s and EDV 13.7 cm/s. Triphasic  Right distal femoral artery: PSV 74.1 cm/s and EDV 16.0 cm/s. Triphasic  Right popliteal artery: PSV 71.8 cm/s and EDV 18.4 cm/s.   Triphasic/biphasic    Right proximal anterior tibial artery: PSV 99.7 cm/s and EDV 32.3 cm/s.   Biphasic  Right distal anterior tibial artery: PSV 99.7 cm/s and EDV 32.3 cm/s.   Biphasic  Right proximal posterior tibial artery: PSV 76.4 cm/s and EDV 16.0 cm/s.   Biphasic  Right distal posterior tibial artery: PSV 76.4 cm/s and EDV 25.3 cm/s.   Biphasic  Right proximal peroneal artery: PSV 55.5 cm/s and EDV 13.7 cm/s. Biphasic  Right distal peroneal artery: PSV 50.9 cm/s and EDV 0 cm/s. Biphasic  Right dorsalis pedis artery: .6 cm/s and EDV 49.5 cm/s. Biphasic    IMPRESSION:    Varying degrees of atherosclerosis in the visualized right lower   extremity arteries as described.    --- End of Report ---

## 2022-06-28 NOTE — PROGRESS NOTE ADULT - SUBJECTIVE AND OBJECTIVE BOX
Date of service: 6/28/22  HPI:63 y/o M with PMH lymphoma (in remission past 10 years),and  right hip surgery presents with right foot wounds and deformity. Pt reports having right hip surgery years ago and he feels that lymphoma may have seeded. He is currently in remission for lymphoma and has not had any recent f/u at [facility] [place]. He states that the lymphoma spread in his right femur and his right left is now 5.5 inches shorter than the left. He was fitted for a boot at [facility] 8 years ago and he states the boot was too tight and caused chronic wounds on his right foot, along with swelling which lead to the deformity of the foot. He came to the ER today because a vascular doctor saw him at the facility and recommended he come to be evaluated. He reports chronic wounds on the foot, ulcer near the 5th digit with oozing, and states that wound has been present for approximately one month. The wound is no more hot, tender, or painful than it has been. His wounds have been managed a the facility with zinc oxide and A&D ointment. Denies fevers, chills, chest pain, SOB, abdominal pain, N/V, diarrhea/constipation.       PAST MEDICAL & SURGICAL HISTORY:  History of hip surgery  right hip    Allergies  No Known Allergies      MEDICATIONS  (STANDING):  collagenase Ointment 1 Application(s) Topical daily  doxycycline IVPB 100 milliGRAM(s) IV Intermittent every 12 hours  enoxaparin Injectable 40 milliGRAM(s) SubCutaneous every 24 hours  mupirocin 2% Ointment 1 Application(s) Topical two times a day  piperacillin/tazobactam IVPB.. 3.375 Gram(s) IV Intermittent every 8 hours  vitamin A &amp; D Ointment 1 Application(s) Topical two times a day  zinc oxide 20% Ointment 1 Application(s) Topical two times a day    MEDICATIONS  (PRN):  acetaminophen     Tablet .. 650 milliGRAM(s) Oral every 6 hours PRN Temp greater or equal to 38C (100.4F), Mild Pain (1 - 3)  aluminum hydroxide/magnesium hydroxide/simethicone Suspension 30 milliLiter(s) Oral every 4 hours PRN Dyspepsia  melatonin 3 milliGRAM(s) Oral at bedtime PRN Insomnia  ondansetron Injectable 4 milliGRAM(s) IV Push every 8 hours PRN Nausea and/or Vomiting        SOCIAL HISTORY:    FAMILY HISTORY:  FH: Alzheimers disease (Mother)    FH: lung cancer (Father, Grandparent)    VITALS:  Vital Signs Last 24 Hrs  T(C): 36.7 (28 Jun 2022 08:55), Max: 37.2 (27 Jun 2022 17:36)  T(F): 98 (28 Jun 2022 08:55), Max: 99 (27 Jun 2022 17:36)  HR: 65 (28 Jun 2022 08:55) (65 - 82)  BP: 143/86 (28 Jun 2022 08:55) (113/71 - 155/94)  BP(mean): 82 (27 Jun 2022 22:04) (82 - 82)  RR: 18 (28 Jun 2022 08:55) (16 - 18)  SpO2: 98% (28 Jun 2022 08:55) (98% - 100%)      Physical Examination:  Constitutional: AAOx3, non-focal; NAD, comfortable resting   Focused LE exam:  Vascular: Temperature gradient is warm to warm b/l, Pulses on right non-palpable due to edema,  DP/PT is 2/4 on left, capillary re-fill time is brisk and instantaneous to digits 1-5 left. Non pitting edema noted to right lower extremity   Neuro: diminished protective sensation to the foot and digits 1-5 right and intact to digits 1-5 on left.  Derm:  Skin is warm, Gross deformity note to right foot due to overgrowth of epidermal layer of skin, unable to visualize digits 1-4 and first digit also difficult  approx 3.5x3.5 cm dorsal partial thickness ulceration on the right foot, no pus, no drainge no probe to bone. Hypergranular full thickness ulceration noted to the distal aspect of right foot approx 6.8 x 5.2 cm. No probe to bone, no pus no periwound erythema to right foot.  Musculoskeletal: Manual muscle testing is 5/5 in all muscle groups of the foot/ankle to left foot and ankle and 0/5 in all muscle groups of RLE        LABS:                        13.8   5.00  )-----------( 161      ( 27 Jun 2022 17:44 )             41.4     06-27    141  |  108  |  19  ----------------------------<  99  4.2   |  28  |  0.87    Ca    9.2      27 Jun 2022 17:44    TPro  7.9  /  Alb  3.7  /  TBili  0.4  /  DBili  x   /  AST  14<L>  /  ALT  18  /  AlkPhos  79  06-27            RADIOLOGY:          < from: US Duplex Arterial Lower Ext Ltd, Right (06.27.22 @ 23:05) >  PROCEDURE DATE:  06/27/2022          INTERPRETATION:  CLINICAL INDICATION: Right lower extremity edema and   pain, assess PAD.    TECHNIQUE: Right lower extremity duplex arterial ultrasound was performed   utilizing grayscale, color and spectral Doppler.    COMPARISON: None.    FINDINGS:    Right common femoral artery: PSV 87.1 cm/s and EDV 0 cm/s. Triphasic  Right deep femoral artery: PSV 64.8 cm/s and EDV 0 cm/s.  Rightproximal femoral artery: PSV 99.7 cm/s and EDV 18.4 cm/s. Triphasic  Right mid femoral artery: PSV 90.4 cm/s and EDV 13.7 cm/s. Triphasic  Right distal femoral artery: PSV 74.1 cm/s and EDV 16.0 cm/s. Triphasic  Right popliteal artery: PSV 71.8 cm/s and EDV 18.4 cm/s.   Triphasic/biphasic    Right proximal anterior tibial artery: PSV 99.7 cm/s and EDV 32.3 cm/s.   Biphasic  Right distal anterior tibial artery: PSV 99.7 cm/s and EDV 32.3 cm/s.   Biphasic  Right proximal posterior tibial artery: PSV 76.4 cm/s and EDV 16.0 cm/s.   Biphasic  Right distal posterior tibial artery: PSV 76.4 cm/s and EDV 25.3 cm/s.   Biphasic  Right proximal peroneal artery: PSV 55.5 cm/s and EDV 13.7 cm/s. Biphasic  Right distal peroneal artery: PSV 50.9 cm/s and EDV 0 cm/s. Biphasic  Right dorsalis pedis artery: .6 cm/s and EDV 49.5 cm/s. Biphasic    IMPRESSION:    Varying degrees of atherosclerosis in the visualized right lower   extremity arteries as described.    --- End of Report ---    < end of copied text >  < from: US Duplex Venous Lower Ext Ltd, Right (06.27.22 @ 19:45) >  FINDINGS:    There is normal compressibility of the right common femoral, femoral and   popliteal veins.  The contralateral common femoral vein is patent.  Dopplerexamination shows normal spontaneous and phasic flow.    No posterior tibial vein thrombosis is detected. The remaining calf veins   are not visualized.    IMPRESSION:  No evidence of right lower extremity deep venous thrombosis.  Limited visualization of the right calf veins.      < end of copied text >

## 2022-06-28 NOTE — PROGRESS NOTE ADULT - ASSESSMENT
Assessment:   - RLE Chronic Venous Insuffiencey   - Right Foot Non healing ulceration  - Difficulty with ambulation      Plan:  - Pt evaluated and chart reviewed.  - Reviewed the imaging and labs  - Discussed the potential causes and plan of management with the Pt.  - Discussed all the different aspects of treatment with the Pt including conservative and surgical options.  - Pt demonstrated verbal understanding.  - Applied santyl and muprocin DSD to all ulcer of the foot   - Recommended ID consult.  - Rec. Vasc consult  - X-rays reviewed no evidence of soft tissue emphysema on wet read, will f/u with final impression  - All additional care per medicine, appreciated.  - Podiatry will follow the case in house.      WOUND CARE INSTRUCTIONS FOR PATIENT TO BE PERFORMED DAILY TO Right FOOT:  - Please remove dressings to Right foot carefully.   - Cleanse the wound to the right foot with normal saline moistened gauze, and dry it with sterile gauze.   - Then apply santyl/collegenase, to the right plantar wound, and bactroban/muporicin to the right dorsal wound.   - Then cover the ulcerative lesions with a 4x4 gauze, and wrap with Kerlix and secure with tape.   - Keep the compressive dressing to the left leg clean, dry and intact at all times.   - Please, have the left heel offloaded in the provided Z-flex boot at all times, while the patient is bedbound.  Thanks         Assessment:   - RLE Chronic Venous Insuffiencey   - Right Foot Non healing ulceration  - Difficulty with ambulation      Plan:  - Pt evaluated and chart reviewed.  - Reviewed the imaging and labs  - Discussed the potential causes and plan of management with the Pt.  - Discussed all the different aspects of treatment with the Pt including conservative and surgical options.  - Pt demonstrated verbal understanding.  - Applied santyl and muprocin DSD to all ulcer of the foot   - Recommended ID consult.  - Rec. Vasc consult  - X-rays reviewed no evidence of soft tissue emphysema on wet read, will f/u with final impression  - All additional care per medicine, appreciated.  - Please follow with Dr. River podiatrist once outpatient,.   - Patient stable from podaitric standpoint.       WOUND CARE INSTRUCTIONS FOR PATIENT TO BE PERFORMED DAILY TO Right FOOT:  - Please remove dressings to Right foot carefully.   - Cleanse the wound to the right foot with normal saline moistened gauze, and dry it with sterile gauze.   - Then apply santyl/collegenase, to the right plantar wound, and bactroban/muporicin to the right dorsal wound.   - Then cover the ulcerative lesions with a 4x4 gauze, and wrap with Kerlix and secure with tape.   - Keep the compressive dressing to the left leg clean, dry and intact at all times.   - Please, have the left heel offloaded in the provided Z-flex boot at all times, while the patient is bedbound.  Thanks

## 2022-06-28 NOTE — DISCHARGE NOTE NURSING/CASE MANAGEMENT/SOCIAL WORK - PATIENT PORTAL LINK FT
You can access the FollowMyHealth Patient Portal offered by [facility] by registering at the following website: [email or web address]. By joining [email or web address]’s FollowMyHealth portal, you will also be able to view your health information using other applications (apps) compatible with our system.

## 2022-06-28 NOTE — PROVIDER CONTACT NOTE (OTHER) - SITUATION
Right foot wound/deformity, possible PAD
spoke with service will notify Dr. Poole
spoke with service will notify Dr. Barr

## 2022-06-28 NOTE — PROGRESS NOTE ADULT - SUBJECTIVE AND OBJECTIVE BOX
61 y/o M with PMH lymphoma (in remission past 10 years), right hip surgery presents with right foot wounds and deformity. Pt reports having right hip surgery years ago and he feels that lymphoma may have seeded. He is currently in remission for lymphoma and has not had any recent [place] [facility] [place]. He states that the lymphoma spread in his right femur and his right left is now 5.5 inches shorter than the left. He was fitted for a boot at [facility] 8 years ago and he state that the boot was too tight and caused chronic foots on his right foot, swelling of the foot. He came to the ER today because a vascular doctor saw him at the facility and recommended he come to be evaluated. He reports chronic wounds on the foot, ulcer near the 5th digit with oozing, and bleeding for the first time today. The wound is not more hot, tender, or painful than it has been. His wounds have been managed a the facility with zinc oxide and A&D ointment. No other current complaints. Denies fevers, chills, chest pain, SOB, abdominal pain, N/V, diarrhea/constipation.     ER course: VSS. Labs: unremarkable. COVID, influenza A/B, RSV negative. EKG: NSR with HR 81 bpm, PVCs, RBBB,  ms prolonged, no ST segment changes, no T wave inversions (personally reviewed).     Imaging:   - CXR: no consolidation, no effusion, no pneumothorax (personally reviewed).   - XR right tib/fib: no facture or dislocation, osteoarthritis (personally reviewed).   - XR right foot: distorted phalanges, bony erosion 5th digit, soft tissue swelling (personally reviewed).   - Venous doppler lower extremities: No evidence of right lower extremity deep venous thrombosis. Limited visualization of the right calf veins.    Pt was given Zosyn. He is being admitted to med/surg for further management.   no acute overnight events , denies CP, SOB, abd pain, diarrhea , dysuria   ROS  denies CP, SOB, abd pain, diarrhea , dysuria       PHYSICAL EXAM:    Daily Height in cm: 182.88 (2022 17:36)    Daily     ICU Vital Signs Last 24 Hrs  T(C): 36.6 (2022 22:04), Max: 37.2 (2022 17:36)  T(F): 97.9 (2022 22:04), Max: 99 (2022 17:36)  HR: 82 (2022 22:04) (78 - 82)  BP: 113/71 (2022 22:04) (113/71 - 155/94)  BP(mean): 82 (2022 22:04) (82 - 82)  ABP: --  ABP(mean): --  RR: 18 (2022 22:04) (16 - 18)  SpO2: 98% (2022 22:04) (98% - 100%)    Physical exam    General: Awake and alert, cooperative with exam. No acute distress.   Skin: Warm, dry, and pink.   Eyes: Pupils equal and reactive to light. Extraocular eye movements intact. No conjunctival injection, discharge, or scleral icterus.   HEENT: Atraumatic, normocephalic. Moist mucus membranes.   Cardiology: Normal S1, S2. No murmurs, rubs, or gallops. Regular rate and rhythm.   Respiratory: Lungs clear to ascultation bilaterally. Good air exchange. No wheezes, rales, or rhonchi. Normal chest expansion.   Gastrointestinal: Positive bowel sounds. Soft, non-tender, non-distended. No guarding, rigidity, or rebound tenderness. No hepatosplenomegaly.   Musculoskeletal: 5/5 motor strength in all extremities. Normal range of motion.   Extremities: Right lower extremity with venous stasis changes, erythema, pitting edema, wounds on the foot and deformed foot. Left lower extremity WNL. Dorsalis pedis pulse 2+ b/l.   Neurological: A+Ox3 (person, place, and time). Cranial nerves 2-12 intact. Normal speech. No facial droop. No focal neurological deficits.   Psychiatric: Normal affect. Normal mood                                    13.8   5.00  )-----------( 161      ( 2022 17:44 )             41.4       CBC Full  -  ( 2022 17:44 )  WBC Count : 5.00 K/uL  RBC Count : 4.74 M/uL  Hemoglobin : 13.8 g/dL  Hematocrit : 41.4 %  Platelet Count - Automated : 161 K/uL  Mean Cell Volume : 87.3 fl  Mean Cell Hemoglobin : 29.1 pg  Mean Cell Hemoglobin Concentration : 33.3 gm/dL  Auto Neutrophil # : 3.49 K/uL  Auto Lymphocyte # : 1.02 K/uL  Auto Monocyte # : 0.36 K/uL  Auto Eosinophil # : 0.09 K/uL  Auto Basophil # : 0.03 K/uL  Auto Neutrophil % : 69.8 %  Auto Lymphocyte % : 20.4 %  Auto Monocyte % : 7.2 %  Auto Eosinophil % : 1.8 %  Auto Basophil % : 0.6 %          141  |  108  |  19  ----------------------------<  99  4.2   |  28  |  0.87    Ca    9.2      2022 17:44    TPro  7.9  /  Alb  3.7  /  TBili  0.4  /  DBili  x   /  AST  14<L>  /  ALT  18  /  AlkPhos  79        LIVER FUNCTIONS - ( 2022 17:44 )  Alb: 3.7 g/dL / Pro: 7.9 gm/dL / ALK PHOS: 79 U/L / ALT: 18 U/L / AST: 14 U/L / GGT: x             PT/INR - ( 2022 17:44 )   PT: 12.6 sec;   INR: 1.09 ratio         PTT - ( 2022 17:44 )  PTT:31.7 sec          Urinalysis Basic - ( 2022 06:04 )    Color: Yellow / Appearance: Clear / S.020 / pH: x  Gluc: x / Ketone: Negative  / Bili: Negative / Urobili: Negative   Blood: x / Protein: Negative / Nitrite: Negative   Leuk Esterase: Trace / RBC: Negative /HPF / WBC 3-5   Sq Epi: x / Non Sq Epi: Few / Bacteria: x            MEDICATIONS  (STANDING):  doxycycline IVPB 100 milliGRAM(s) IV Intermittent every 12 hours  piperacillin/tazobactam IVPB.. 3.375 Gram(s) IV Intermittent every 8 hours  vitamin A &amp; D Ointment 1 Application(s) Topical two times a day  zinc oxide 20% Ointment 1 Application(s) Topical two times a day           US DPLX LWR EXT VEINS LTD RT                        	  	PROCEDURE DATE:  2022    	  	  	  	INTERPRETATION:  CLINICAL INFORMATION: Right leg swelling  	  	COMPARISON: None available.  	  	TECHNIQUE: Duplex sonography of the RIGHT LOWER extremity veins with   	color and spectral Doppler, with and without compression.  	  	FINDINGS:  	  	There is normal compressibility of the right common femoral, femoral and   	popliteal veins.  	The contralateral common femoral vein is patent.  	Dopplerexamination shows normal spontaneous and phasic flow.  	  	No posterior tibial vein thrombosis is detected. The remaining calf veins   	are not visualized.  	  	IMPRESSION:  	No evidence of right lower extremity deep venous thrombosis.  	Limited visualization of the right calf veins.  	  	  	  	  	  	--- End of Report ---

## 2022-06-28 NOTE — DISCHARGE NOTE PROVIDER - HOSPITAL COURSE
61 y/o M with PMH lymphoma (in remission past 10 years), right hip surgery presents with right foot wounds and deformity. Pt reports having right hip surgery years ago and he feels that lymphoma may have seeded. He is currently in remission for lymphoma and has not had any recent [place] [facility] [place] He states that the lymphoma spread in his right femur and his right left is now 5.5 inches shorter than the left. He was fitted for a boot at [facility] 8 years ago and he state that the boot was too tight and caused chronic foots on his right foot, swelling of the foot. He came to the ER today because a vascular doctor saw him at the facility and recommended he come to be evaluated. He reports chronic wounds on the foot, ulcer near the 5th digit with oozing, and bleeding for the first time today. The wound is not more hot, tender, or painful than it has been. His wounds have been managed a the facility with zinc oxide and A&D ointment. No other current complaints. Denies fevers, chills, chest pain, SOB, abdominal pain, N/V, diarrhea/constipation.     ER course: VSS. Labs: unremarkable. COVID, influenza A/B, RSV negative. EKG: NSR with HR 81 bpm, PVCs, RBBB,  ms prolonged, no ST segment changes, no T wave inversions (personally reviewed).     Imaging:   - CXR: no consolidation, no effusion, no pneumothorax (personally reviewed).   - XR right tib/fib: no facture or dislocation, osteoarthritis (personally reviewed).   - XR right foot: distorted phalanges, bony erosion 5th digit, soft tissue swelling (personally reviewed).   - Venous doppler lower extremities: No evidence of right lower extremity deep venous thrombosis. Limited visualization of the right calf veins.    Pt was given Zosyn. He is being admitted to med/surg for further management.  6/28 no acute overnight events , denies CP, SOB, abd pain, diarrhea , dysuria   ROS  denies CP, SOB, abd pain, diarrhea , dysuria Physical exam    General: Awake and alert, cooperative with exam. No acute distress.   Skin: Warm, dry, and pink.   Eyes: Pupils equal and reactive to light. Extraocular eye movements intact. No conjunctival injection, discharge, or scleral icterus.   HEENT: Atraumatic, normocephalic. Moist mucus membranes.   Cardiology: Normal S1, S2. No murmurs, rubs, or gallops. Regular rate and rhythm.   Respiratory: Lungs clear to ascultation bilaterally. Good air exchange. No wheezes, rales, or rhonchi. Normal chest expansion.   Gastrointestinal: Positive bowel sounds. Soft, non-tender, non-distended. No guarding, rigidity, or rebound tenderness. No hepatosplenomegaly.   Musculoskeletal: 5/5 motor strength in all extremities. Normal range of motion.   Extremities: Right lower extremity with venous stasis changes, erythema, pitting edema, wounds on the foot and deformed foot. Left lower extremity WNL. Dorsalis pedis pulse 2+ b/l.   Neurological: A+Ox3 (person, place, and time). Cranial nerves 2-12 intact. Normal speech. No facial droop. No focal neurological deficits.   Psychiatric: Normal affect. Normal mood    61 y/o M presents with right foot wound and deformity      Right foot nonhealing ulcer and deformity - Not infected   right foot deformity and wound, likely pressure wound from ill fitting boot.  RLE Chronic Venous Insuffiencey    Arterial duplex does not show any significant stenosis per vascular .   continue local wound care to right footno vascular surgery intervention necessary at this time, please reconsult if necessary  NO indication for abx per ID and podiatry   Dr. River podiatrist once outpatient,. -    Pt does not meet SIRS criteria   - c/w Zosyn and Vancomycin    Arterial doppler ->Varying degrees of atherosclerosis in the visualized right lower   extremity arteries as described.   RLE venous doppler -no DVT   podiatry ID and vascular cleared pt     # History of Lymphoma (in remission past 10 years), right hip surgery   - Home medications reviewed with pt at the bedside     DVT ppx: lovenox SC

## 2022-07-03 DIAGNOSIS — I89.0 LYMPHEDEMA, NOT ELSEWHERE CLASSIFIED: ICD-10-CM

## 2022-07-03 DIAGNOSIS — Z88.1 ALLERGY STATUS TO OTHER ANTIBIOTIC AGENTS STATUS: ICD-10-CM

## 2022-07-03 DIAGNOSIS — I87.2 VENOUS INSUFFICIENCY (CHRONIC) (PERIPHERAL): ICD-10-CM

## 2022-07-03 DIAGNOSIS — L89.899 PRESSURE ULCER OF OTHER SITE, UNSPECIFIED STAGE: ICD-10-CM

## 2022-07-03 DIAGNOSIS — C85.90 NON-HODGKIN LYMPHOMA, UNSPECIFIED, UNSPECIFIED SITE: ICD-10-CM

## 2022-07-03 DIAGNOSIS — I87.8 OTHER SPECIFIED DISORDERS OF VEINS: ICD-10-CM

## 2022-07-03 LAB
CULTURE RESULTS: SIGNIFICANT CHANGE UP
CULTURE RESULTS: SIGNIFICANT CHANGE UP
SPECIMEN SOURCE: SIGNIFICANT CHANGE UP
SPECIMEN SOURCE: SIGNIFICANT CHANGE UP

## 2023-11-10 PROBLEM — C85.90 NON-HODGKIN LYMPHOMA, UNSPECIFIED, UNSPECIFIED SITE: Chronic | Status: ACTIVE | Noted: 2022-06-28

## 2023-11-10 PROBLEM — I89.0 LYMPHEDEMA, NOT ELSEWHERE CLASSIFIED: Chronic | Status: ACTIVE | Noted: 2022-06-28
